# Patient Record
Sex: FEMALE | Race: BLACK OR AFRICAN AMERICAN
[De-identification: names, ages, dates, MRNs, and addresses within clinical notes are randomized per-mention and may not be internally consistent; named-entity substitution may affect disease eponyms.]

---

## 2019-09-17 ENCOUNTER — HOSPITAL ENCOUNTER (EMERGENCY)
Dept: HOSPITAL 88 - ER | Age: 29
Discharge: HOME | End: 2019-09-17
Payer: COMMERCIAL

## 2019-09-17 VITALS — BODY MASS INDEX: 37.56 KG/M2 | HEIGHT: 64 IN | WEIGHT: 220 LBS

## 2019-09-17 DIAGNOSIS — J45.909: ICD-10-CM

## 2019-09-17 DIAGNOSIS — R10.9: Primary | ICD-10-CM

## 2019-09-17 DIAGNOSIS — R19.7: ICD-10-CM

## 2019-09-17 DIAGNOSIS — R11.2: ICD-10-CM

## 2019-09-17 LAB
ALBUMIN SERPL-MCNC: 3.5 G/DL (ref 3.5–5)
ALBUMIN/GLOB SERPL: 0.8 {RATIO} (ref 0.8–2)
ALP SERPL-CCNC: 71 IU/L (ref 40–150)
ALT SERPL-CCNC: 10 IU/L (ref 0–55)
AMYLASE SERPL-CCNC: 51 U/L (ref 25–125)
ANION GAP SERPL CALC-SCNC: 11.9 MMOL/L (ref 8–16)
BACTERIA URNS QL MICRO: (no result) /HPF
BASOPHILS # BLD AUTO: 0 10*3/UL (ref 0–0.1)
BASOPHILS NFR BLD AUTO: 0.4 % (ref 0–1)
BILIRUB UR QL: NEGATIVE
BUN SERPL-MCNC: 7 MG/DL (ref 7–26)
BUN/CREAT SERPL: 7 (ref 6–25)
CALCIUM SERPL-MCNC: 9.5 MG/DL (ref 8.4–10.2)
CHLORIDE SERPL-SCNC: 102 MMOL/L (ref 98–107)
CLARITY UR: (no result)
CO2 SERPL-SCNC: 24 MMOL/L (ref 22–29)
COLOR UR: YELLOW
DEPRECATED NEUTROPHILS # BLD AUTO: 4.3 10*3/UL (ref 2.1–6.9)
DEPRECATED RBC URNS MANUAL-ACNC: (no result) /HPF (ref 0–5)
EGFRCR SERPLBLD CKD-EPI 2021: > 60 ML/MIN (ref 60–?)
EOSINOPHIL # BLD AUTO: 0 10*3/UL (ref 0–0.4)
EOSINOPHIL NFR BLD AUTO: 0.2 % (ref 0–6)
EPI CELLS URNS QL MICRO: (no result) /LPF
ERYTHROCYTE [DISTWIDTH] IN CORD BLOOD: 17.7 % (ref 11.7–14.4)
GLOBULIN PLAS-MCNC: 4.4 G/DL (ref 2.3–3.5)
GLUCOSE SERPLBLD-MCNC: 92 MG/DL (ref 74–118)
HCG UR QL: NEGATIVE
HCT VFR BLD AUTO: 30.1 % (ref 34.2–44.1)
HGB BLD-MCNC: 8.8 G/DL (ref 12–16)
KETONES UR QL STRIP.AUTO: NEGATIVE
LEUKOCYTE ESTERASE UR QL STRIP.AUTO: NEGATIVE
LYMPHOCYTES # BLD: 0.8 10*3/UL (ref 1–3.2)
LYMPHOCYTES NFR BLD AUTO: 13.6 % (ref 18–39.1)
MAGNESIUM SERPL-MCNC: 2.1 MG/DL (ref 1.3–2.1)
MCH RBC QN AUTO: 19.2 PG (ref 28–32)
MCHC RBC AUTO-ENTMCNC: 29.2 G/DL (ref 31–35)
MCV RBC AUTO: 65.7 FL (ref 81–99)
MONOCYTES # BLD AUTO: 0.4 10*3/UL (ref 0.2–0.8)
MONOCYTES NFR BLD AUTO: 7.3 % (ref 4.4–11.3)
NEUTS SEG NFR BLD AUTO: 78.1 % (ref 38.7–80)
NITRITE UR QL STRIP.AUTO: NEGATIVE
PLATELET # BLD AUTO: 348 X10E3/UL (ref 140–360)
POTASSIUM SERPL-SCNC: 3.9 MMOL/L (ref 3.5–5.1)
PROT UR QL STRIP.AUTO: (no result)
RBC # BLD AUTO: 4.58 X10E6/UL (ref 3.6–5.1)
SODIUM SERPL-SCNC: 134 MMOL/L (ref 136–145)
SP GR UR STRIP: 1.02 (ref 1.01–1.02)
UROBILINOGEN UR STRIP-MCNC: 2 MG/DL (ref 0.2–1)
WBC #/AREA URNS HPF: (no result) /HPF (ref 0–5)

## 2019-09-17 PROCEDURE — 36415 COLL VENOUS BLD VENIPUNCTURE: CPT

## 2019-09-17 PROCEDURE — 87086 URINE CULTURE/COLONY COUNT: CPT

## 2019-09-17 PROCEDURE — 71046 X-RAY EXAM CHEST 2 VIEWS: CPT

## 2019-09-17 PROCEDURE — 85025 COMPLETE CBC W/AUTO DIFF WBC: CPT

## 2019-09-17 PROCEDURE — 80053 COMPREHEN METABOLIC PANEL: CPT

## 2019-09-17 PROCEDURE — 81025 URINE PREGNANCY TEST: CPT

## 2019-09-17 PROCEDURE — 83735 ASSAY OF MAGNESIUM: CPT

## 2019-09-17 PROCEDURE — 99284 EMERGENCY DEPT VISIT MOD MDM: CPT

## 2019-09-17 PROCEDURE — 82150 ASSAY OF AMYLASE: CPT

## 2019-09-17 PROCEDURE — 81001 URINALYSIS AUTO W/SCOPE: CPT

## 2019-09-17 NOTE — DIAGNOSTIC IMAGING REPORT
Chest, 2 views,  9/17/2019.   

 



History: Fever and vomiting.



Comparison: None available.



Findings: The cardiomediastinal silhouette and pulmonary vasculature are within

normal limits. The lungs are clear without evidence of consolidation or pleural

effusion.  There are no acute osseous or soft tissue abnormalities. 



Impression: 

No acute cardiopulmonary abnormality. 



Signed by: Desmond Maldonado on 9/17/2019 3:33 PM

## 2020-11-14 ENCOUNTER — HOSPITAL ENCOUNTER (EMERGENCY)
Dept: HOSPITAL 88 - ER | Age: 30
Discharge: HOME | End: 2020-11-14
Payer: SELF-PAY

## 2020-11-14 VITALS — WEIGHT: 220 LBS | HEIGHT: 64 IN | BODY MASS INDEX: 37.56 KG/M2

## 2020-11-14 DIAGNOSIS — J45.909: ICD-10-CM

## 2020-11-14 DIAGNOSIS — R19.00: Primary | ICD-10-CM

## 2020-11-14 DIAGNOSIS — R10.2: ICD-10-CM

## 2020-11-14 LAB
ALBUMIN SERPL-MCNC: 3.6 G/DL (ref 3.5–5)
ALBUMIN/GLOB SERPL: 0.8 {RATIO} (ref 0.8–2)
ALP SERPL-CCNC: 55 IU/L (ref 40–150)
ALT SERPL-CCNC: 10 IU/L (ref 0–55)
ANION GAP SERPL CALC-SCNC: 13.8 MMOL/L (ref 8–16)
BACTERIA URNS QL MICRO: (no result) /HPF
BASOPHILS # BLD AUTO: 0.1 10*3/UL (ref 0–0.1)
BASOPHILS NFR BLD AUTO: 0.5 % (ref 0–1)
BILIRUB UR QL: NEGATIVE
BUN SERPL-MCNC: 8 MG/DL (ref 7–26)
BUN/CREAT SERPL: 10 (ref 6–25)
CALCIUM SERPL-MCNC: 9.3 MG/DL (ref 8.4–10.2)
CHLORIDE SERPL-SCNC: 104 MMOL/L (ref 98–107)
CLARITY UR: (no result)
CO2 SERPL-SCNC: 22 MMOL/L (ref 22–29)
COLOR UR: YELLOW
DEPRECATED APTT PLAS QN: 34.8 SECONDS (ref 23.8–35.5)
DEPRECATED INR PLAS: 1
DEPRECATED NEUTROPHILS # BLD AUTO: 7.6 10*3/UL (ref 2.1–6.9)
DEPRECATED RBC URNS MANUAL-ACNC: (no result) /HPF (ref 0–5)
EGFRCR SERPLBLD CKD-EPI 2021: > 60 ML/MIN (ref 60–?)
EOSINOPHIL # BLD AUTO: 0.1 10*3/UL (ref 0–0.4)
EOSINOPHIL NFR BLD AUTO: 0.8 % (ref 0–6)
EPI CELLS URNS QL MICRO: (no result) /LPF
ERYTHROCYTE [DISTWIDTH] IN CORD BLOOD: 18.3 % (ref 11.7–14.4)
GLOBULIN PLAS-MCNC: 4.7 G/DL (ref 2.3–3.5)
GLUCOSE SERPLBLD-MCNC: 94 MG/DL (ref 74–118)
HCT VFR BLD AUTO: 30.1 % (ref 34.2–44.1)
HGB BLD-MCNC: 8.4 G/DL (ref 12–16)
KETONES UR QL STRIP.AUTO: (no result)
LEUKOCYTE ESTERASE UR QL STRIP.AUTO: NEGATIVE
LYMPHOCYTES # BLD: 2.1 10*3/UL (ref 1–3.2)
LYMPHOCYTES NFR BLD AUTO: 19 % (ref 18–39.1)
MCH RBC QN AUTO: 18.4 PG (ref 28–32)
MCHC RBC AUTO-ENTMCNC: 27.9 G/DL (ref 31–35)
MCV RBC AUTO: 66 FL (ref 81–99)
MONOCYTES # BLD AUTO: 1 10*3/UL (ref 0.2–0.8)
MONOCYTES NFR BLD AUTO: 8.9 % (ref 4.4–11.3)
MUCOUS THREADS URNS QL MICRO: (no result)
NEUTS SEG NFR BLD AUTO: 70.2 % (ref 38.7–80)
NITRITE UR QL STRIP.AUTO: NEGATIVE
PLATELET # BLD AUTO: 305 X10E3/UL (ref 140–360)
POTASSIUM SERPL-SCNC: 3.8 MMOL/L (ref 3.5–5.1)
PROT UR QL STRIP.AUTO: (no result)
PROTHROMBIN TIME: 13.7 SECONDS (ref 11.9–14.5)
RBC # BLD AUTO: 4.56 X10E6/UL (ref 3.6–5.1)
SODIUM SERPL-SCNC: 136 MMOL/L (ref 136–145)
SP GR UR STRIP: >=1.03 (ref 1.01–1.02)
UROBILINOGEN UR STRIP-MCNC: 0.2 MG/DL (ref 0.2–1)
WBC #/AREA URNS HPF: (no result) /HPF (ref 0–5)

## 2020-11-14 PROCEDURE — 81001 URINALYSIS AUTO W/SCOPE: CPT

## 2020-11-14 PROCEDURE — 85730 THROMBOPLASTIN TIME PARTIAL: CPT

## 2020-11-14 PROCEDURE — 99284 EMERGENCY DEPT VISIT MOD MDM: CPT

## 2020-11-14 PROCEDURE — 74177 CT ABD & PELVIS W/CONTRAST: CPT

## 2020-11-14 PROCEDURE — 85025 COMPLETE CBC W/AUTO DIFF WBC: CPT

## 2020-11-14 PROCEDURE — 80053 COMPREHEN METABOLIC PANEL: CPT

## 2020-11-14 PROCEDURE — 85610 PROTHROMBIN TIME: CPT

## 2020-11-14 PROCEDURE — 36415 COLL VENOUS BLD VENIPUNCTURE: CPT

## 2020-11-14 PROCEDURE — 87086 URINE CULTURE/COLONY COUNT: CPT

## 2020-11-14 PROCEDURE — 84702 CHORIONIC GONADOTROPIN TEST: CPT

## 2020-11-14 NOTE — DIAGNOSTIC IMAGING REPORT
EXAM: CT Abdomen and Pelvis WITH contrast  

INDICATION: PELVIC MASS 

COMPARISON: None.

TECHNIQUE: Abdomen and pelvis were scanned utilizing a multidetector helical

scanner from the lung base to the pubic symphysis after administration of IV

contrast. Coronal and sagittal reformations were obtained. Routine protocol was

performed. Scan was performed when during portal venous phase.    

     IV CONTRAST: 100 mL of Isovue 370

     ORAL CONTRAST: None

            

COMPLICATIONS: None



RADIATION DOSE:

     Total DLP: 783 mGy*cm

     Estimated effective dose: (DLP x 0.015 x size factor) mSv

     CTDIvol has been reviewed. It is below the limits set by the Radiation

Protocol Committee (RPC).

     Dose modulation, iterative reconstruction, and/or weight based adjustment

of the mA/kV was utilized to reduce the radiation dose to as low as reasonably

achievable. 



FINDINGS:



LINES and TUBES: None.



LOWER THORAX:  There is bibasilar atelectasis.



HEPATOBILIARY:      There are multiple scattered too small to characterize

hypodensities in the liver, likely benign. No biliary ductal dilation. 



GALLBLADDER: No radio-opaque stones or sludge.  No wall thickening.



SPLEEN: No splenomegaly. 



PANCREAS: No focal masses or ductal dilatation.  



ADRENALS: No adrenal nodules    



KIDNEYS/URETERS: Kidneys enhance symmetrically.  No hydronephrosis. No cystic

or solid mass lesions.  No stones.



GI TRACT: No abnormal distention, wall thickening, or evidence of bowel

obstruction.       Appendix is normal.



PELVIC ORGANS/BLADDER: There is a large heterogeneous enhancing pelvic mass

which measures approximately 10 x 14 x 16 cm (AP, transverse,

superior-inferior). The uterus and right ovary are not clearly discernible from

this pelvic mass. The left ovary is identified in series 2 image 72.



LYMPH NODES: There are multiple prominent para-aortic lymph nodes, none of

which meet size criteria for pathologic enlargement. No lymphadenopathy.



VESSELS: Unremarkable.



PERITONEUM / RETROPERITONEUM: No free air or fluid.



BONES: The bones are normal for age with no suspicious osteolytic or

osteoblastic lesions.



SOFT TISSUES: Unremarkable.            



IMPRESSION: 

1.  Large heterogeneously enhancing pelvic mass which measures approximately 10

x 14 x 16 cm and not clearly discernible from the uterus and right ovary.

Differential considerations includes large uterine mass versus ovarian mass. 

2.  Recommend further evaluation with dedicated pelvic ultrasound to identify

the ovaries, pelvic MRI for further characterization of this pelvic mass and

GYN consultation for further evaluation and management.



Signed by: Jose Camarillo MD on 11/14/2020 12:15 PM

## 2020-11-14 NOTE — XMS REPORT
Continuity of Care Document

                             Created on: 2020



SURI HAYDEN

External Reference #: [id number]

: 1990

Sex: Female



Demographics





                          Address                   [address]

[place]  [address]

 

                          Home Phone                [phone]

 

                          Preferred Language        Unknown

 

                          Marital Status            Unknown

 

                          Advent Affiliation     Unknown

 

                          Race                      Unknown

 

                          Ethnic Group              Unknown





Author





                          Author                    [facility]

 

                          Organization              [facility]

 

                          Address                   [address]

[place]  [address]



 

                          Phone                     Unavailable







Care Team Providers





                    Care Team Member Name Role                Phone

 

                    NO,  PCP            PCP                 Unavailable

 

                    FROILAN HUTCHINSON   Attpati             Unavailable







Problems

This patient has no known problems.



Allergies, Adverse Reactions, Alerts





        Allergy Name Allergy Type Status  Severity Reaction(s) Onset Date Inacti

ve Date 

Treating Clinician        Comments                  Source

 

       Penicillin Allergy to Substance Active Severe        2019 00:00:00 

                     [facility] [place] [place]







Medications





             Ordered Medication Name Filled Medication Name Start Date   Stop Da

te    Current 

Medication? Ordering Clinician Indication Dosage     Frequency  Signature (SIG) 

Comments                  Components                Source

 

                    Ondansetron Hcl (Zofran*) 4 Mg Tablet Ondansetron Hcl (Zofra

n*) 4 Mg Tablet 

2019 00:00:00           Yes       Mekhi Calderon Np           4         

          Every 4 Hours as needed for 

Nausea And Vomiting                                         [facility]

 

                    Ondansetron Hcl (Zofran*) 4 Mg Tablet Ondansetron Hcl (Zofra

n*) 4 Mg Tablet 

2019 00:00:00           Yes       Mekhi Calderon Np           4         

          Every 4 Hours as needed for 

Nausea                                                      [facility]







Procedures





                Procedure       Date / Time Performed Performing Clinician [facility]

e

 

                X-ray of chest, two views 2019 00:00:00 MEKHI CALDERON 

[facility] [place] [place]







Encounters





             Start Date/Time End Date/Time Encounter Type Admission Type Attendi

[facility] Facility   Care Department Encounter ID    Source

 

             2019 10:31:00 2019 16:40:00 Departed Emergency Room 1  

          FROILAN HUTCHINSON           [facility]          [id number]    [facility] [place] [place]







Results





           Test Description Test Time  Test Comments Results    Result Comments 

Source

 

                    Urine WBC           2019 15:32:00   

 

                                        Test Item

 

             Urine WBC (test code = 5821-4) NONE         0-5                    

    





[facility] [place] [id number] 15:32:00* 



             Test Item    Value        Reference Range Interpretation Comments

 

             Urine RBC (test code = 80224-8) 6-10         0-5          H        

     





[facility] [place] Gspaojpd1675-93-57 15:32:00* 



             Test Item    Value        Reference Range Interpretation Comments

 

             Urine Bacteria (test code = 45780-1) FEW          NONE             

          





[facility] [place] Epithelial Bznwj8295-12-92 15:32:00
  * 



             Test Item    Value        Reference Range Interpretation Comments

 

             Urine Epithelial Cells (test code = 26256-9) FEW          NONE     

                  





[facility] [place], [place]CHEST 2 [id number] 15:32:00         
                                                                            [facility]                        [address] [address]      Patient Name: SURI HAYDEN           
                       MR #: [id number]                     : 1990     
                             Age/Sex: 29/F  Acct #: [id number]                
             Req #: [id number]  Adm Physician:                                  
                   Ordered by: MEKHI CALDERON NP                            
Report #: [id number]        Location: ER                                      
Room/Bed:                     _____________________________________________
______________________________________________________    Procedure: [id number] D
X/CHEST 2 VIEWS  Exam Date: 19                            Exam Time: 1433 
                                            REPORT STATUS: Signed    Chest, 2 v
iews,  2019.             History: Fever and vomiting.      Comparison: None
available.      Findings: The cardiomediastinal silhouette and pulmonary vascul
ature are within   normal limits. The lungs are clear without evidence of consol
idation or pleural   effusion.  There are no acute osseous or soft tissue abnorm
alities.       Impression:    No acute cardiopulmonary abnormality.       Signed
by: Desmond Maldonado on 2019 3:33 PM        Dictated By: DESMOND MALDONADO MD  El
ectronically Signed By: DESMOND MALDONADO MD on 19 1533  Transcribed By: LO OLVERA on 19 1533       COPY TO:   MEKHI CALDERON NP         Urine Color
2019 15:16:00* 



             Test Item    Value        Reference Range Interpretation Comments

 

             Urine Color (test code = 5778-6) YELLOW       YELLOW               

      





[facility] [place] [id number] 15:16:00* 



             Test Item    Value        Reference Range Interpretation Comments

 

             Urine Clarity (test code = 51665-8) SL CLOUDY    CLEAR             

         





[facility] [place] Specific Xwqjyff2313-52-80 15:16:00
  * 



             Test Item    Value        Reference Range Interpretation Comments

 

             Urine Specific Gravity (test code = 5811-5) 1.020        1.010-1.02

5                





[facility] [place] [id number] 15:16:00* 



             Test Item    Value        Reference Range Interpretation Comments

 

             Urine pH (test code = 02449-5) 7            5-7                    

    





[facility] [place]Urine Leukocyte Hapckwau4303-15-80 
15:16:00* 



             Test Item    Value        Reference Range Interpretation Comments

 

             Urine Leukocyte Esterase (test code = 25036-9) NEGATIVE     NEGATIV

E                   





[facility] [place]Urine Lclfpgz6007-09-17 15:16:00* 



             Test Item    Value        Reference Range Interpretation Comments

 

             Urine Nitrite (test code = 25225-3) NEGATIVE     NEGATIVE          

         





[facility] [place]Urine Ocnzspf5247-12-49 15:16:00* 



             Test Item    Value        Reference Range Interpretation Comments

 

             Urine Protein (test code = 57735-3) 1+           NEGATIVE     H    

         





[facility] [place] Glucose (UA)2019 15:16:00* 



             Test Item    Value        Reference Range Interpretation Comments

 

             Urine Glucose (UA) (test code = 31428-8) NEGATIVE     NEGATIVE     

              





[facility] [place]Urine Gudukmj7180-00-69 15:16:00* 



             Test Item    Value        Reference Range Interpretation Comments

 

             Urine Ketones (test code = 05532-6) NEGATIVE     NEGATIVE          

         





[facility] [place]Urine Rwfzhwaivacg5383-08-21 15:16:00* 



             Test Item    Value        Reference Range Interpretation Comments

 

             Urine Urobilinogen (test code = 66776-2) 2            0.2-1        

              





[facility] [place]Urine Rpnswfwkh1279-33-19 15:16:00* 



             Test Item    Value        Reference Range Interpretation Comments

 

             Urine Bilirubin (test code = 1977-8) NEGATIVE     NEGATIVE         

          





[facility] [place]Urine Jqoud9609-99-42 15:16:00* 



             Test Item    Value        Reference Range Interpretation Comments

 

             Urine Blood (test code = 05830-0) 3+           NEGATIVE            

       





[facility] 



             Test Item    Value        Reference Range Interpretation Comments

 

             Sodium Level (test code = 2951-2) 134          136-145      L      

       





[facility] [place]Potassium Qysap5238-01-18 12:53:00* 



             Test Item    Value        Reference Range Interpretation Comments

 

             Potassium Level (test code = 2823-3) 3.9          3.5-5.1          

          





[facility] [place], TXChloride Ektqn5962-59-97 12:53:00* 



             Test Item    Value        Reference Range Interpretation Comments

 

             Chloride Level (test code = 2075-0) 102                      

         





[facility] [place], TXCarbon Dioxide Zrlcx2544-05-26 12:53:00* 



             Test Item    Value        Reference Range Interpretation Comments

 

             Carbon Dioxide Level (test code = 2028-9) 24           22-29       

               





[facility] [place], TXAnion [id number] 12:53:00* 



             Test Item    Value        Reference Range Interpretation Comments

 

             Anion Gap (test code = 33037-3) 11.9         8-16                  

     





[facility] [place], TXBlood Urea Wafesqej1101-46-24 12:53:00* 



             Test Item    Value        Reference Range Interpretation Comments

 

             Blood Urea Nitrogen (test code = 3094-0) 7            7-26         

              





[facility] [place], TXCreatinine2019-09-17 12:53:00* 



             Test Item    Value        Reference Range Interpretation Comments

 

             Creatinine (test code = 2160-0) 0.97         0.57-1.11             

     





[facility] [place], TXBUN/Creatinine Bxmzm7740-18-21 12:53:00* 



             Test Item    Value        Reference Range Interpretation Comments

 

             BUN/Creatinine Ratio (test code = 3097-3) 7            6-25        

               





[facility] [place], TXEstimat Glomerular Filtration Rate
2019 12:53:00* 



             Test Item    Value        Reference Range Interpretation Comments

 

             Estimat Glomerular Filtration Rate (test code = 174567917) > 60    

     >60                        





Ranges were taken from the National Kidney Disease Education Program and the [facility] literature.Reference ranges:60 or greater: Zvzmax36-82 (
for 3 consecutive months): Chronic kidney disease 15 or less: [address] [place], TXGlucose Cqdda0762-40-95 12:53:00* 



             Test Item    Value        Reference Range Interpretation Comments

 

             Glucose Level (test code = DQJ4407) 92                       

         





[facility] [place], TXCalcium Obcgb5273-18-74 12:53:00* 



             Test Item    Value        Reference Range Interpretation Comments

 

             Calcium Level (test code = 42163-2) 9.5          8.4-10.2          

         





[facility] [place], TXMagnesium Hwvmc7032-96-36 12:53:00* 



             Test Item    Value        Reference Range Interpretation Comments

 

             Magnesium Level (test code = 68128-9) 2.1          1.3-2.1         

           





[facility] [place], TXTotal Kfswcltar4871-53-09 12:53:00* 



             Test Item    Value        Reference Range Interpretation Comments

 

             Total Bilirubin (test code = 1975-2) 0.4          0.2-1.2          

          





[facility] [place], TXAspartate Amino Transf (AST/SGOT)
2019 12:53:00* 



             Test Item    Value        Reference Range Interpretation Comments

 

                                        Aspartate Amino Transf (AST/SGOT) (test 

code = Aspartate Amino Transf 

(AST/SGOT))     17              5-34                             





[facility] [place], TXAlanine Aminotransferase (ALT/SGPT)
2019 12:53:00* 



             Test Item    Value        Reference Range Interpretation Comments

 

             Alanine Aminotransferase (ALT/SGPT) (test code = 1742-6) 10        

   0-55                       





[facility] [place], TXTotal Bvbtikp4156-74-67 12:53:00* 



             Test Item    Value        Reference Range Interpretation Comments

 

             Total Protein (test code = 2885-2) 7.9          6.5-8.1            

        





[facility] [place], TXAlbumin2019-09-17 12:53:00* 



             Test Item    Value        Reference Range Interpretation Comments

 

             Albumin (test code = 1751-7) 3.5          3.5-5.0                  

  





[facility] [place], TXGlobulin2019-09-17 12:53:00* 



             Test Item    Value        Reference Range Interpretation Comments

 

             Globulin (test code = 42584-9) 4.4          2.3-3.5      H         

    





[facility] [place], TXAlbumin/Globulin Omzhw6501-30-23 12:53:00
  * 



             Test Item    Value        Reference Range Interpretation Comments

 

             Albumin/Globulin Ratio (test code = 1759-0) 0.8          0.8-2.0   

                 





[facility] [place], TXAlkaline Ctyzqlwhias6537-53-59 12:53:00* 



             Test Item    Value        Reference Range Interpretation Comments

 

             Alkaline Phosphatase (test code = 6768-6) 71                 

               





[facility] [place], TXAmylase Wgcih0298-12-85 12:53:00* 



             Test Item    Value        Reference Range Interpretation Comments

 

             Amylase Level (test code = 1798-8) 51                        

        





[facility] [place], TXWhite Blood Wlzss4608-61-80 12:23:00* 



             Test Item    Value        Reference Range Interpretation Comments

 

             White Blood Count (test code = 6690-2) 5.50         4.8-10.8       

            





[facility] [place], TXRed Blood Keudx8259-77-34 12:23:00* 



             Test Item    Value        Reference Range Interpretation Comments

 

             Red Blood Count (test code = 789-8) 4.58         3.6-5.1           

         





[facility] [place], TXHemoglobin2019-09-17 12:23:00* 



             Test Item    Value        Reference Range Interpretation Comments

 

             Hemoglobin (test code = 62171-2) 8.8          12.0-16.0    L       

      





[facility] [place], TXHematocrit2019-09-17 12:23:00* 



             Test Item    Value        Reference Range Interpretation Comments

 

             Hematocrit (test code = 4544-3) 30.1         34.2-44.1    L        

     





[facility] [place], TXMean Corpuscular Gxkzfx8668-72-26 
12:23:00* 



             Test Item    Value        Reference Range Interpretation Comments

 

             Mean Corpuscular Volume (test code = 787-2) 65.7         81-99     

   L             





[facility] [place], TXMean Corpuscular Waklelgdhj7058-65-79 
12:23:00* 



             Test Item    Value        Reference Range Interpretation Comments

 

             Mean Corpuscular Hemoglobin (test code = 785-6) 19.2         28-32 

       L             





[facility] [place], TXMean Corpuscular Hemoglobin Concent
2019 12:23:00* 



             Test Item    Value        Reference Range Interpretation Comments

 

             Mean Corpuscular Hemoglobin Concent (test code = 786-4) 29.2       

  31-35        L             





[facility] [place], TXRed Cell Distribution Dgrcd5442-27-84 
12:23:00* 



             Test Item    Value        Reference Range Interpretation Comments

 

             Red Cell Distribution Width (test code = 91864-0) 17.7         11.7

-14.4    H             





[facility] [place], TXPlatelet Swelx5596-23-83 12:23:00* 



             Test Item    Value        Reference Range Interpretation Comments

 

             Platelet Count (test code = 777-3) 348          140-360            

        





[facility] [place], TXNeutrophils (%) (Auto)2019 12:23:00
  * 



             Test Item    Value        Reference Range Interpretation Comments

 

             Neutrophils (%) (Auto) (test code = 64854-8) 78.1         38.7-80.0

                  





[facility] [place], TXLymphocytes (%) (Auto)2019 12:23:00
  * 



             Test Item    Value        Reference Range Interpretation Comments

 

             Lymphocytes (%) (Auto) (test code = 736-9) 13.6         18.0-39.1  

  L             





[facility] [place], TXMonocytes (%) (Auto)2019 12:23:00* 



             Test Item    Value        Reference Range Interpretation Comments

 

             Monocytes (%) (Auto) (test code = 5905-5) 7.3          4.4-11.3    

               





[facility] [place], TXEosinophils (%) (Auto)2019 12:23:00
  * 



             Test Item    Value        Reference Range Interpretation Comments

 

             Eosinophils (%) (Auto) (test code = 713-8) 0.2          0.0-6.0    

                





[facility] [place], TXBasophils (%) (Auto)2019 12:23:00* 



             Test Item    Value        Reference Range Interpretation Comments

 

             Basophils (%) (Auto) (test code = 706-2) 0.4          0.0-1.0      

              





[facility] [place], TXIM GRANULOCYTES %2019 12:23:00* 



             Test Item    Value        Reference Range Interpretation Comments

 

             IM GRANULOCYTES % (test code = IM GRANULOCYTES %) 0.4          0.0-

1.0                    





[facility] [place], TXNeutrophils # (Auto)2019 12:23:00* 



             Test Item    Value        Reference Range Interpretation Comments

 

             Neutrophils # (Auto) (test code = 751-8) 4.3          2.1-6.9      

              





[facility] [place], TXLymphocytes # (Auto)2019 12:23:00* 



             Test Item    Value        Reference Range Interpretation Comments

 

             Lymphocytes # (Auto) (test code = 56874-6) 0.8          1.0-3.2    

  L             





[facility] [place], TXMonocytes # (Auto)2019 12:23:00* 



             Test Item    Value        Reference Range Interpretation Comments

 

             Monocytes # (Auto) (test code = 742-7) 0.4          0.2-0.8        

            





[facility] [place], TXEosinophils # (Auto)2019 12:23:00* 



             Test Item    Value        Reference Range Interpretation Comments

 

             Eosinophils # (Auto) (test code = 711-2) 0.0          0.0-0.4      

              





[facility] [place], TXBasophils # (Auto)2019 12:23:00* 



             Test Item    Value        Reference Range Interpretation Comments

 

             Basophils # (Auto) (test code = 704-7) 0.0          0.0-0.1        

            





[facility] [place], TXAbsolute Immature Granulocyte (auto
2019 12:23:00* 



             Test Item    Value        Reference Range Interpretation Comments

 

                                        Absolute Immature Granulocyte (auto (mehnaz

t code = Absolute Immature Granulocyte 

(auto)          0.02            0-0.1                            





[facility] [place], TXUrine Pregnancy Cfyp2799-40-65 11:30:00* 



             Test Item    Value        Reference Range Interpretation Comments

 

             Urine Pregnancy Test (test code = 2106-3) NEGATIVE     NEGATIVE    

               





[facility] [place]

## 2020-11-14 NOTE — NUR
BEDSIDE ULTRASOUND BY MD D/T LARGE FIRM ABD PALPABLE MASS NOTED. NO FETUS 
NOTED, CONCERN FOR MASS PER MD, CT ORDERED STAT..

## 2020-11-14 NOTE — EMERGENCY DEPARTMENT NOTE
History of Present Illnes


History of Present Illness


Chief Complaint:  Genitourinary


History of Present Illness


This is a 30 year old  female PELVIC PAIN X 3 DAYS. AAOX4. 

AMBULATORY. NO DISTRESS AT TIME OF TRIAGE. LMP 2 WEEKS AGO, HAS HEAVY PERIODS. 

AFTER MY PHYSICAL EXAM SHE ALSO REPORTS FEELING FIRM MASS WHICH HAS BEEN GROWING

FOR ~1 YEAR - SOME DOCTOR TOLD HER IT WAS JUST MUSCLES


Historian:  Patient, Family Member


Arrival Mode:  Car


 Required:  No


Onset (how long ago):  day(s)


Location:  [facility]


Quality:  PAIN


Radiation:  Reports non-radiation


Severity:  mild


Onset quality:  gradual


Timing of current episode:  intermittent


Progression:  waxing and waning


Chronicity:  new


Context:  Denies recent illness


Relieving factors:  none


Exacerbating factors:  none


Associated symptoms:  Reports denies other symptoms





Past Medical/Family History


Physician Review


I have reviewed the patient's past medical and family history.  Any updates have

been documented here.





Past Medical History


Recent Fever:  No


Clinical Suspicion of Infectio:  Yes


New/Unexplained Change in Ment:  No


Past Medical History:  Asthma


Past Surgical History:  None





Social History


Smoking Cessation:  Never Smoker


Counseling Performed:  No


Alcohol Use:  None


Any Illegal Drug Use:  No


TB Exposure/Symptoms:  No


Physically hurt or threatened:  No





Family History


Family history of heart diseas:  No





Other


Last Tetanus:  UNKNOWN


Any Pre-Existing Lines (PICC,:  No





Review of Systems


Review of Systems


Constitutional:  Reports no symptoms


EENTM:  Reports no symptoms


Cardiovascular:  Reports no symptoms


Respiratory:  Reports no symptoms


Gastrointestinal:  Reports no symptoms


Genitourinary:  Reports as per HPI


Musculoskeletal:  Reports no symptoms


Integumentary:  Reports no symptoms


Neurological:  Reports no symptoms


Psychological:  Reports no symptoms


Endocrine:  Reports no symptoms


Hematological/Lymphatic:  Reports no symptoms





Physical Exam


Related Data


Allergies:  


Coded Allergies:  


     Penicillins (Verified  Allergy, Severe, 9/17/19)


Triage Vital Signs





Vital Signs








  Date Time  Temp Pulse Resp B/P (MAP) Pulse Ox O2 Delivery O2 Flow Rate FiO2


 


11/14/20 10:14 99.1 95 16 118/83 100 Room Air  








Vital signs reviewed:  Yes





Physical Exam


CONSTITUTIONAL





Constitutional:  Present well-developed, Present well-nourished


HENT


HENT:  Present normocephalic, Present atraumatic, Present oropharynx 

clear/moist, Present nose normal


HENT L/R:  Present left ext ear normal, Present right ext ear normal


EYES





Eyes:  Reports PERRL, Reports conjunctivae normal


NECK


Neck:  Present ROM normal


PULMONARY


Pulmonary:  Present effort normal, Present breath sounds normal


CARDIOVASCULAR





Cardiovascular:  Present regular rhythm, Present heart sounds normal, Present 

capillary refill normal, Present normal rate


GASTROINTESTINAL





Abdominal:  Present soft, Present nontender, Present bowel sounds normal, 

Present mass (LARGE ~20 CM MASS LOWER MID PELVIS, FIRM, 

NON-MOBILE/NON-TENDER/NON-PULSATILE - I DID A QUICK U/S - NOT A FETUS)


GENITOURINARY





Genitourinary:  Present exam deferred


SKIN


Skin:  Present warm, Present dry


MUSCULOSKELETAL





Musculoskeletal:  Present ROM normal


NEUROLOGICAL





Neurological:  Present alert, Present oriented x 3, Present no gross motor or 

sensory deficits


PSYCHOLOGICAL


Psychological:  Present mood/affect normal, Present judgement normal





Results


Laboratory


Result Diagram:  


11/14/20 1020                                                                   

            11/14/20 1020





Laboratory





Laboratory Tests








Test


 11/14/20


10:20


 


White Blood Count


 10.78 x10e3/uL


(4.8-10.8)


 


Red Blood Count


 4.56 x10e6/uL


(3.6-5.1)


 


Hemoglobin


 8.4 g/dL


(12.0-16.0)


 


Hematocrit


 30.1 %


(34.2-44.1)


 


Mean Corpuscular Volume


 66.0 fL


(81-99)


 


Mean Corpuscular Hemoglobin


 18.4 pg


(28-32)


 


Mean Corpuscular Hemoglobin


Concent 27.9 g/dL


(31-35)


 


Red Cell Distribution Width


 18.3 %


(11.7-14.4)


 


Platelet Count


 305 x10e3/uL


(140-360)


 


Neutrophils (%) (Auto)


 70.2 %


(38.7-80.0)


 


Lymphocytes (%) (Auto)


 19.0 %


(18.0-39.1)


 


Monocytes (%) (Auto)


 8.9  %


(4.4-11.3)


 


Eosinophils (%) (Auto)


 0.8 %


(0.0-6.0)


 


Basophils (%) (Auto)


 0.5 %


(0.0-1.0)


 


Neutrophils # (Auto) 7.6 (2.1-6.9) 


 


Lymphocytes # (Auto) 2.1 (1.0-3.2) 


 


Monocytes # (Auto) 1.0 (0.2-0.8) 


 


Eosinophils # (Auto) 0.1 (0.0-0.4) 


 


Basophils # (Auto) 0.1 (0.0-0.1) 


 


Absolute Immature Granulocyte


(auto 0.06 x10e3/uL


(0-0.1)


 


Urine Color


 Yellow


(YELLOW)


 


Urine Clarity Hazy (CLEAR) 


 


Urine pH 5.5 (5 - 7) 


 


Urine Specific Gravity


 >=1.030


(1.010-1.025)


 


Urine Protein 1+ (NEGATIVE) 


 


Urine Glucose (UA)


 Negative


(NEGATIVE)


 


Urine Ketones 1+ (NEGATIVE) 


 


Urine Blood


 Small


(NEGATIVE)


 


Urine Nitrite


 Negative


(NEGATIVE)


 


Urine Bilirubin


 Negative


(NEGATIVE)


 


Urine Urobilinogen


 0.2 mg/dL (0.2


- 1)


 


Urine Leukocyte Esterase


 Negative


(NEGATIVE)


 


Urine RBC 0-5 /HPF (0-5) 


 


Urine WBC 0-5 /HPF (0-5) 


 


Urine Epithelial Cells


 Few /LPF


(NONE)


 


Urine Amorphous Sediment Few (FEW) 


 


Urine Bacteria


 Few /HPF


(NONE)


 


Urine Mucus Few (RARE) 


 


Sodium Level


 136 mmol/L


(136-145)


 


Potassium Level


 3.8 mmol/L


(3.5-5.1)


 


Chloride Level


 104 mmol/L


()


 


Carbon Dioxide Level


 22 mmol/L


(22-29)


 


Anion Gap


 13.8 mmol/L


(8-16)


 


Blood Urea Nitrogen 8 mg/dL (7-26) 


 


Creatinine


 0.79 mg/dL


(0.57-1.11)


 


Estimat Glomerular Filtration


Rate > 60 ML/MIN


(60-)


 


BUN/Creatinine Ratio 10 (6-25) 


 


Glucose Level


 94 mg/dL


()


 


Calcium Level


 9.3 mg/dL


(8.4-10.2)


 


Total Bilirubin


 0.4 mg/dL


(0.2-1.2)


 


Aspartate Amino Transf


(AST/SGOT) 16 IU/L (5-34) 





 


Alanine Aminotransferase


(ALT/SGPT) 10 IU/L (0-55) 





 


Alkaline Phosphatase


 55 IU/L


()


 


Total Protein


 8.3 g/dL


(6.5-8.1)


 


Albumin


 3.6 g/dL


(3.5-5.0)


 


Globulin


 4.7 g/dL


(2.3-3.5)


 


Albumin/Globulin Ratio 0.8 (0.8-2.0) 


 


Human Chorionic Gonadotropin,


Qual Negative


(NEGATIVE)








Lab results reviewed:  Yes





Imaging


Imaging results reviewed:  Yes


Impressions


EXAM: CT Abdomen and Pelvis WITH contrast  


INDICATION: PELVIC MASS 


COMPARISON: None.


TECHNIQUE: Abdomen and pelvis were scanned utilizing a multidetector helical


scanner from the lung base to the pubic symphysis after administration of IV


contrast. Coronal and sagittal reformations were obtained. Routine protocol was


performed. Scan was performed when during portal venous phase.    


     IV CONTRAST: 100 mL of Isovue 370


     ORAL CONTRAST: None


            


COMPLICATIONS: None





RADIATION DOSE:


     Total DLP: 783 mGy*cm


     Estimated effective dose: (DLP x 0.015 x size factor) mSv


     CTDIvol has been reviewed. It is below the limits set by the Radiation


Protocol Committee (RPC).


     Dose modulation, iterative reconstruction, and/or weight based adjustment


of the mA/kV was utilized to reduce the radiation dose to as low as reasonably


achievable. 





FINDINGS:





LINES and TUBES: None.





LOWER THORAX:  There is bibasilar atelectasis.





HEPATOBILIARY:      There are multiple scattered too small to characterize


hypodensities in the liver, likely benign. No biliary ductal dilation. 





GALLBLADDER: No radio-opaque stones or sludge.  No wall thickening.





SPLEEN: No splenomegaly. 





PANCREAS: No focal masses or ductal dilatation.  





ADRENALS: No adrenal nodules    





KIDNEYS/URETERS: Kidneys enhance symmetrically.  No hydronephrosis. No cystic


or solid mass lesions.  No stones.





GI TRACT: No abnormal distention, wall thickening, or evidence of bowel


obstruction.       Appendix is normal.





PELVIC ORGANS/BLADDER: There is a large heterogeneous enhancing pelvic mass


which measures approximately 10 x 14 x 16 cm (AP, transverse,


superior-inferior). The uterus and right ovary are not clearly discernible from


this pelvic mass. The left ovary is identified in series 2 image 72.





LYMPH NODES: There are multiple prominent para-aortic lymph nodes, none of


which meet size criteria for pathologic enlargement. No lymphadenopathy.





VESSELS: Unremarkable.





PERITONEUM / RETROPERITONEUM: No free air or fluid.





BONES: The bones are normal for age with no suspicious osteolytic or


osteoblastic lesions.





SOFT TISSUES: Unremarkable.            





IMPRESSION: 


1.  Large heterogeneously enhancing pelvic mass which measures approximately 10


x 14 x 16 cm and not clearly discernible from the uterus and right ovary.


Differential considerations includes large uterine mass versus ovarian mass. 


2.  Recommend further evaluation with dedicated pelvic ultrasound to identify


the ovaries, pelvic MRI for further characterization of this pelvic mass and


GYN consultation for further evaluation and management.





Signed by: Jose Camarillo MD on 11/14/2020 12:15 PM





Assessment & Plan


Medical Decision Making


MDM


PT C/O PELVIC PAIN, HAS LARGE PELVIC MASS ON EXAM BUT MINIMAL TTP - CHECK CBC, 

CHEM, UA/CX, PREG, CT ABD/PELVIS IF NEG PREG TEST - EVAL FOR PREGNANCY, UTERINE 

FIBROID/TUMER, OVARIAN MASS, UTI





Reassessment


Reassessment


PT WITHOUT PAIN - DC HOME, F/U GYN DR CARSON





Assessment & Plan


Final Impression:  


(1) Pelvic mass


Depart Disposition:  HOME, SELF-CARE


Last Vital Signs











  Date Time  Temp Pulse Resp B/P (MAP) Pulse Ox O2 Delivery O2 Flow Rate FiO2


 


11/14/20 10:14 99.1 95 16 118/83 100 Room Air  








Home Meds


Active Scripts


Ondansetron Hcl* (ZOFRAN*) 4 Mg Tablet, 4 MG SL Q4HR PRN for NAUSEA, #14 TAB


   Prov:BELGICA CALDERON NP         9/17/19


Ondansetron Hcl* (ZOFRAN*) 4 Mg Tablet, 4 MG PO Q4HR PRN for NAUSEA AND 

VOMITING, #14


   Prov:BELGICA CALDERON NP         9/17/19


Medications in the ED





Sodium Chloride 1,000 ml @  0 mls/hr Q0M STAT IV ;  Start 11/14/20 at 10:44;  

Stop 11/14/20 at 10:45











AI JEAN MD               Nov 14, 2020 11:06